# Patient Record
Sex: FEMALE | Race: WHITE | NOT HISPANIC OR LATINO | URBAN - METROPOLITAN AREA
[De-identification: names, ages, dates, MRNs, and addresses within clinical notes are randomized per-mention and may not be internally consistent; named-entity substitution may affect disease eponyms.]

---

## 2021-01-25 ENCOUNTER — OFFICE VISIT (OUTPATIENT)
Dept: OBSTETRICS AND GYNECOLOGY | Age: 21
End: 2021-01-25

## 2021-01-25 VITALS
DIASTOLIC BLOOD PRESSURE: 72 MMHG | HEIGHT: 65 IN | BODY MASS INDEX: 30.66 KG/M2 | SYSTOLIC BLOOD PRESSURE: 124 MMHG | WEIGHT: 184 LBS

## 2021-01-25 DIAGNOSIS — Z30.432 ENCOUNTER FOR IUD REMOVAL: ICD-10-CM

## 2021-01-25 DIAGNOSIS — N94.6 DYSMENORRHEA: ICD-10-CM

## 2021-01-25 DIAGNOSIS — L70.0 ACNE VULGARIS: ICD-10-CM

## 2021-01-25 DIAGNOSIS — N92.6 IRREGULAR MENSES: Primary | ICD-10-CM

## 2021-01-25 LAB
B-HCG UR QL: NEGATIVE
INTERNAL NEGATIVE CONTROL: NEGATIVE
INTERNAL POSITIVE CONTROL: POSITIVE
Lab: NORMAL

## 2021-01-25 PROCEDURE — 58301 REMOVE INTRAUTERINE DEVICE: CPT | Performed by: OBSTETRICS & GYNECOLOGY

## 2021-01-25 PROCEDURE — 99204 OFFICE O/P NEW MOD 45 MIN: CPT | Performed by: OBSTETRICS & GYNECOLOGY

## 2021-01-25 PROCEDURE — 81025 URINE PREGNANCY TEST: CPT | Performed by: OBSTETRICS & GYNECOLOGY

## 2021-01-25 RX ORDER — NORGESTIMATE AND ETHINYL ESTRADIOL 0.25-0.035
1 KIT ORAL DAILY
Qty: 84 TABLET | Refills: 3 | Status: SHIPPED | OUTPATIENT
Start: 2021-01-25

## 2021-01-25 RX ORDER — SPIRONOLACTONE 50 MG/1
50 TABLET, FILM COATED ORAL DAILY
COMMUNITY
Start: 2020-08-31 | End: 2021-08-31

## 2021-01-25 NOTE — PROGRESS NOTES
Subjective       History of Present Illness  Poppy Blake is a 20 y.o. female is being seen today for IUD removal.  Patient is getting ready to go to [facility] for 3 years and she does not like the acne, weight gain, and weight she feels that she is have the progestin IUD.  Risk benefits potential complications reviewed.  She is interested in getting started on birth control pills  Chief Complaint   Patient presents with   • Gynecologic Exam     New gyn:Problem,iud check,occasional abdominal pain,had Mirena placed 1/20,Discuss different bc options   .        The following portions of the patient's history were reviewed and updated as appropriate: allergies, current medications, past family history, past medical history, past social history, past surgical history and problem list.    PAST MEDICAL HISTORY  Past Medical History:   Diagnosis Date   • IUD (intrauterine device) in place     Mirena, 1/20     OB History   No obstetric history on file.     No past surgical history on file.  No family history on file.  Social History     Tobacco Use   Smoking Status Not on file       Current Outpatient Medications:   •  spironolactone (ALDACTONE) 50 MG tablet, Take 50 mg by mouth Daily., Disp: , Rfl:   •  norgestimate-ethinyl estradiol (ORTHO-CYCLEN) 0.25-35 MG-MCG per tablet, Take 1 tablet by mouth Daily., Disp: 84 tablet, Rfl: 3    There is no immunization history on file for this patient.    Review of Systems       Except as outlined in history of physical illness, patient denies any changes in her GYN, , GI systems. All other systems reviewed are negative.    Objective   Physical Exam   Alert and oriented, respirations unlabored, heart regular rate and rhythm   Pelvic external genitalia normal vagina clean dry intact cervix is nulliparous uterus not large adnexa nontender nonenlarged rectal exams normal      Assessment/Plan   Diagnoses and all orders for this visit:    1. Irregular menses (Primary)  -     Chlamydia /  Gonococcus / Mycoplasma Genitalium, LAILA, Urine - Urine, Clean Catch  -     POC Pregnancy, Urine    2. Dysmenorrhea  -     Chlamydia / Gonococcus / Mycoplasma Genitalium, LAILA, Urine - Urine, Clean Catch  -     POC Pregnancy, Urine    3. Acne vulgaris  -     Chlamydia / Gonococcus / Mycoplasma Genitalium, LAILA, Urine - Urine, Clean Catch  -     POC Pregnancy, Urine    4. Encounter for IUD removal  -     Chlamydia / Gonococcus / Mycoplasma Genitalium, LAILA, Urine - Urine, Clean Catch  -     POC Pregnancy, Urine    Other orders  -     norgestimate-ethinyl estradiol (ORTHO-CYCLEN) 0.25-35 MG-MCG per tablet; Take 1 tablet by mouth Daily.  Dispense: 84 tablet; Refill: 3      IUD removalIUD Removal Procedure Note    Type of IUD:  Mirena  Date of insertion:  unknown  Reason for removal:  Side effect: Acne, weight gain  Other relevant history/information:  none    Procedure Time Out Documentation      Procedure Details  IUD strings visible:  yes  Local anesthesia:  None  Tenaculum used:  None  Removal:  IUD strings grasped and IUD removed intact with gentle traction.  The patient tolerated the procedure well.    All appropriate instructions regarding removal were reviewed.    Plans for contraception:  oral contraceptives (estrogen/progesterone)    Other follow-up needed:  none    The patient was advised to call for any fever or for prolonged or severe pain or bleeding. She was advised to use Naproxen as needed for mild to moderate pain.       Procedures    EMR Dragon/ Transcription disclaimer:  Much of the encounter note is an electronic transcription/translation of spoken language to printed text. The electronic translation of spoken language may permit erroneous, or at times, nonessential words or phrases to be inadvertently transcribes; Although i have reviewed the note for such errors, some may still exist.           Orders Placed This Encounter   Procedures   • Chlamydia / Gonococcus / Mycoplasma Genitalium, LAILA, Urine -  Urine, Clean Catch   • POC Pregnancy, Urine           EMR Dragon/ Transcription disclaimer:  Much of the encounter note is an electronic transcription/translation of spoken language to printed text. The electronic translation of spoken language may permit erroneous, or at times, nonessential words or phrases to be inadvertently transcribes; Although i have reviewed the note for such errors, some may still exist.

## 2021-02-01 LAB
C TRACH RRNA UR QL NAA+PROBE: NEGATIVE
M GENITALIUM DNA UR QL NAA+PROBE: NEGATIVE
N GONORRHOEA RRNA UR QL NAA+PROBE: NEGATIVE